# Patient Record
Sex: MALE | Race: WHITE
[De-identification: names, ages, dates, MRNs, and addresses within clinical notes are randomized per-mention and may not be internally consistent; named-entity substitution may affect disease eponyms.]

---

## 2017-02-01 ENCOUNTER — HOSPITAL (OUTPATIENT)
Dept: OTHER | Age: 37
End: 2017-02-01
Attending: HOSPITALIST

## 2017-02-01 LAB
ANALYZER ANC (IANC): ABNORMAL
ANION GAP SERPL CALC-SCNC: 11 MMOL/L (ref 10–20)
APTT PPP: 27 SECONDS (ref 22–30)
APTT PPP: 34 SECONDS (ref 22–30)
APTT PPP: ABNORMAL S
APTT PPP: NORMAL S
BASOPHILS # BLD: 0 THOUSAND/MCL (ref 0–0.3)
BASOPHILS NFR BLD: 0 %
BUN SERPL-MCNC: 12 MG/DL (ref 10–20)
BUN/CREAT SERPL: 15 (ref 7–25)
CALCIUM SERPL-MCNC: 9.2 MG/DL (ref 8.4–10.2)
CHLORIDE: 107 MMOL/L (ref 98–107)
CO2 SERPL-SCNC: 30 MMOL/L (ref 21–32)
CREAT SERPL-MCNC: 0.79 MG/DL (ref 0.67–1.17)
DIFFERENTIAL METHOD BLD: ABNORMAL
EOSINOPHIL # BLD: 0.1 THOUSAND/MCL (ref 0.1–0.5)
EOSINOPHIL NFR BLD: 1 %
ERYTHROCYTE [DISTWIDTH] IN BLOOD: 12.8 % (ref 11–15)
GLUCOSE SERPL-MCNC: 114 MG/DL (ref 65–99)
HEMATOCRIT: 48.4 % (ref 39–51)
HGB BLD-MCNC: 17.3 GM/DL (ref 13–17)
INR PPP: 1.1
LYMPHOCYTES # BLD: 3.8 THOUSAND/MCL (ref 1–4.8)
LYMPHOCYTES NFR BLD: 28 %
MAGNESIUM SERPL-MCNC: 1.9 MG/DL (ref 1.7–2.4)
MCH RBC QN AUTO: 30.7 PG (ref 26–34)
MCHC RBC AUTO-ENTMCNC: 35.7 GM/DL (ref 32–36.5)
MCV RBC AUTO: 86 FL (ref 78–100)
MONOCYTES # BLD: 0.8 THOUSAND/MCL (ref 0.3–0.9)
MONOCYTES NFR BLD: 6 %
NEUTROPHILS # BLD: 9 THOUSAND/MCL (ref 1.8–7.7)
NEUTROPHILS NFR BLD: 65 %
NEUTS SEG NFR BLD: ABNORMAL %
PERCENT NRBC: ABNORMAL
PLATELET # BLD: 239 THOUSAND/MCL (ref 140–450)
POTASSIUM SERPL-SCNC: 4 MMOL/L (ref 3.4–5.1)
PROTHROMBIN TIME: 11.7 SECONDS (ref 9.7–11.8)
PROTHROMBIN TIME: NORMAL
RBC # BLD: 5.63 MILLION/MCL (ref 4.5–5.9)
SODIUM SERPL-SCNC: 144 MMOL/L (ref 135–145)
TROPONIN I SERPL HS-MCNC: 1.42 NG/ML
WBC # BLD: 13.8 THOUSAND/MCL (ref 4.2–11)

## 2017-02-02 ENCOUNTER — PRIOR ORIGINAL RECORDS (OUTPATIENT)
Dept: OTHER | Age: 37
End: 2017-02-02

## 2017-02-02 LAB
ANALYZER ANC (IANC): NORMAL
APTT PPP: 44 SECONDS (ref 22–30)
APTT PPP: ABNORMAL S
ERYTHROCYTE [DISTWIDTH] IN BLOOD: 12.6 % (ref 11–15)
HEMATOCRIT: 40.8 % (ref 39–51)
HGB BLD-MCNC: 14 GM/DL (ref 13–17)
MCH RBC QN AUTO: 29.9 PG (ref 26–34)
MCHC RBC AUTO-ENTMCNC: 34.3 GM/DL (ref 32–36.5)
MCV RBC AUTO: 87.2 FL (ref 78–100)
PLATELET # BLD: 170 THOUSAND/MCL (ref 140–450)
RBC # BLD: 4.68 MILLION/MCL (ref 4.5–5.9)
WBC # BLD: 7 THOUSAND/MCL (ref 4.2–11)

## 2017-02-03 ENCOUNTER — DIAGNOSTIC TRANS (OUTPATIENT)
Dept: OTHER | Age: 37
End: 2017-02-03

## 2017-03-07 ENCOUNTER — PRIOR ORIGINAL RECORDS (OUTPATIENT)
Dept: OTHER | Age: 37
End: 2017-03-07

## 2017-03-08 LAB
BUN: 12 MG/DL
CALCIUM: 9.2 MG/DL
CHLORIDE: 107 MEQ/L
CREATININE, SERUM: 0.79 MG/DL
GLUCOSE: 114 MG/DL
HEMATOCRIT: 40.8 %
HEMATOCRIT: 86 %
HEMOGLOBIN: 14 G/DL
HEMOGLOBIN: 17.3 G/DL
MAGNESIUM: 1.9 MG/DL
PLATELETS: 170 K/UL
PLATELETS: 239 K/UL
POTASSIUM, SERUM: 4 MEQ/L
RED BLOOD COUNT: 4.68 X 10-6/U
RED BLOOD COUNT: 5.63 X 10-6/U
SODIUM: 144 MEQ/L
WHITE BLOOD COUNT: 13.8 X 10-3/U
WHITE BLOOD COUNT: 7 X 10-3/U

## 2017-03-23 ENCOUNTER — MYAURORA ACCOUNT LINK (OUTPATIENT)
Dept: OTHER | Age: 37
End: 2017-03-23

## 2017-03-29 ENCOUNTER — PRIOR ORIGINAL RECORDS (OUTPATIENT)
Dept: OTHER | Age: 37
End: 2017-03-29

## 2017-04-03 ENCOUNTER — PRIOR ORIGINAL RECORDS (OUTPATIENT)
Dept: OTHER | Age: 37
End: 2017-04-03

## 2017-05-31 ENCOUNTER — PRIOR ORIGINAL RECORDS (OUTPATIENT)
Dept: OTHER | Age: 37
End: 2017-05-31

## 2017-06-01 ENCOUNTER — PRIOR ORIGINAL RECORDS (OUTPATIENT)
Dept: OTHER | Age: 37
End: 2017-06-01

## 2017-06-08 ENCOUNTER — OFFICE VISIT (OUTPATIENT)
Dept: INTERNAL MEDICINE CLINIC | Facility: CLINIC | Age: 37
End: 2017-06-08

## 2017-06-08 VITALS
HEIGHT: 73 IN | WEIGHT: 188 LBS | TEMPERATURE: 98 F | BODY MASS INDEX: 24.92 KG/M2 | DIASTOLIC BLOOD PRESSURE: 91 MMHG | SYSTOLIC BLOOD PRESSURE: 137 MMHG | HEART RATE: 63 BPM

## 2017-06-08 DIAGNOSIS — R00.2 PALPITATIONS: Primary | ICD-10-CM

## 2017-06-08 PROBLEM — I49.9 ARRHYTHMIA: Chronic | Status: ACTIVE | Noted: 2017-06-08

## 2017-06-08 PROCEDURE — 93000 ELECTROCARDIOGRAM COMPLETE: CPT | Performed by: INTERNAL MEDICINE

## 2017-06-08 PROCEDURE — 99212 OFFICE O/P EST SF 10 MIN: CPT | Performed by: INTERNAL MEDICINE

## 2017-06-08 PROCEDURE — 99204 OFFICE O/P NEW MOD 45 MIN: CPT | Performed by: INTERNAL MEDICINE

## 2017-06-08 PROCEDURE — 93005 ELECTROCARDIOGRAM TRACING: CPT | Performed by: INTERNAL MEDICINE

## 2017-06-08 NOTE — PATIENT INSTRUCTIONS
1.  Make appointment with your cardiologist.  2.  Stop aspirin until you follow-up with your cardiologist.  About Arrhythmias    Electrical impulses cause the normal heart to beat 60 to 100 times a minute while at rest. These impulses come from a natural p Arrhythmias can often be prevented. The cause and type of arrhythmia determines the best treatment. Sometimes your doctor may want to monitor your heart rate over a 24-hour period or longer.  This can help identify the cause of your arrhythmia and find the · Very irregular heartbeat  · Chest pain (angina) with weakness, dizziness, heavy sweating, nausea, or vomiting  · Extreme drowsiness, or confusion  · Weakness of an arm or leg or one side of the face  · Difficulty with speech or vision  When to seek medic · Managing the things that trigger your heart palpitations.  This could mean:  ¨ Learning ways to reduce stress and anxiety  ¨ Avoiding caffeine, nicotine, alcohol, or illegal drugs  ¨ Stopping the use of certain medicines, under your doctor’s guidance  · M

## 2017-06-08 NOTE — PROGRESS NOTES
Patient ID: Mckenna Bonilla is a 39year old male. Patient presents with:  Dizziness: for the past 3 weeks       HISTORY OF PRESENT ILLNESS:   HPI  Patient presents for above. Patient presents with episodic dizziness for the past 2 months.   Patient h Respiratory: Negative for apnea, cough, choking, chest tightness, shortness of breath, wheezing and stridor. Cardiovascular: Positive for palpitations. Negative for chest pain and leg swelling. Gastrointestinal: Negative. Endocrine: Negative. no tenderness. Abdominal: Soft. Bowel sounds are normal.   Musculoskeletal: Normal range of motion. Neurological: He is alert and oriented to person, place, and time. Skin: Skin is warm and dry.      Orthostatics performed:  Laying - 122/76, 61bpm  Si

## 2018-06-05 ENCOUNTER — MYAURORA ACCOUNT LINK (OUTPATIENT)
Dept: OTHER | Age: 38
End: 2018-06-05

## 2018-06-05 ENCOUNTER — PRIOR ORIGINAL RECORDS (OUTPATIENT)
Dept: OTHER | Age: 38
End: 2018-06-05

## 2019-02-28 VITALS
RESPIRATION RATE: 16 BRPM | HEIGHT: 73 IN | HEART RATE: 60 BPM | SYSTOLIC BLOOD PRESSURE: 118 MMHG | WEIGHT: 192 LBS | DIASTOLIC BLOOD PRESSURE: 80 MMHG | BODY MASS INDEX: 25.45 KG/M2

## 2019-03-01 VITALS
BODY MASS INDEX: 24.39 KG/M2 | HEART RATE: 48 BPM | WEIGHT: 184 LBS | DIASTOLIC BLOOD PRESSURE: 70 MMHG | SYSTOLIC BLOOD PRESSURE: 118 MMHG | HEIGHT: 73 IN

## 2019-04-01 RX ORDER — CHLORAL HYDRATE 500 MG
CAPSULE ORAL
COMMUNITY

## 2019-05-08 ENCOUNTER — APPOINTMENT (OUTPATIENT)
Dept: CARDIOLOGY | Age: 39
End: 2019-05-08
Attending: INTERNAL MEDICINE

## 2019-06-03 ENCOUNTER — TELEPHONE (OUTPATIENT)
Dept: CARDIOLOGY | Age: 39
End: 2019-06-03

## (undated) NOTE — MR AVS SNAPSHOT
[address], [address]  [phone]               Thank you for choosing us for your health care visit with Harjit Morrison MD.  We are glad to serve you and happy to provide you with this summary of your · Fainting or almost fainting  · Palpitations (the sense that your heart is fluttering or beating fast or hard or irregularly)  · Tiredness, fatigue, or weakness  · Cardiac arrest  Causes of arrhythmias  Arrhythmias are most often due to heart disease such medicines you take. These may be affecting your heart rhythm. Follow-up care  Follow up with your healthcare provider, or as advised.  If a Holter monitor has been recommended, contact the cardiologist you have been referred to as soon as you can  t Heart palpitations are a symptom. It’s the feeling you have when your heartbeat seems to be racing, pounding, skipping, or fluttering. Heart palpitations are most often felt in the chest. Sometimes, they may also be felt in the neck.   What causes heart pal fainting   Date Last Reviewed: 5/1/2016  © 7470-6434 [employer] [address] [facility] [place], [address] [place], [place] [address]. All rights reserved. This information is not intended as a substitute for professional medical care.  Always follow your healthcare pr